# Patient Record
Sex: FEMALE | Race: WHITE
[De-identification: names, ages, dates, MRNs, and addresses within clinical notes are randomized per-mention and may not be internally consistent; named-entity substitution may affect disease eponyms.]

---

## 2020-06-29 ENCOUNTER — HOSPITAL ENCOUNTER (OUTPATIENT)
Dept: HOSPITAL 62 - OD | Age: 25
End: 2020-06-29
Attending: PHYSICIAN ASSISTANT
Payer: OTHER GOVERNMENT

## 2020-06-29 DIAGNOSIS — R05: Primary | ICD-10-CM

## 2020-06-29 PROCEDURE — 71046 X-RAY EXAM CHEST 2 VIEWS: CPT

## 2020-06-29 NOTE — RADIOLOGY REPORT (SQ)
EXAM DESCRIPTION:  CHEST PA/LATERAL



IMAGES COMPLETED DATE/TIME:  6/29/2020 9:56 am



REASON FOR STUDY:  PERSISTENT COUGH



COMPARISON:  None.



EXAM PARAMETERS:  NUMBER OF VIEWS: two views

TECHNIQUE: Digital Frontal and Lateral radiographic views of the chest acquired.

RADIATION DOSE: NA

LIMITATIONS: none



FINDINGS:  LUNGS AND PLEURA: Subtle nodular asymmetric opacity in the right medial base.  Possibly pr
ominent nipple shadow.  Recommend repeat chest with nipple markers in place.  Lung fields are otherwi
se clear.  No effusions.  No pneumothorax.  No consolidation.

MEDIASTINUM AND HILAR STRUCTURES: No masses or contour abnormalities.

HEART AND VASCULAR STRUCTURES: Heart normal size.  No evidence for failure.

BONES: No acute findings.

HARDWARE: None in the chest.

OTHER: No other significant finding.



IMPRESSION:  Subtle asymmetric nodular opacity in the right medial base as described.  Repeat chest w
ith nipple markers in place is recommended.



TECHNICAL DOCUMENTATION:  JOB ID:  [id number]

 2011 [employer]- All Rights Reserved



Reading location - IP/workstation name: [email or web address]